# Patient Record
Sex: FEMALE | Race: OTHER | HISPANIC OR LATINO | URBAN - METROPOLITAN AREA
[De-identification: names, ages, dates, MRNs, and addresses within clinical notes are randomized per-mention and may not be internally consistent; named-entity substitution may affect disease eponyms.]

---

## 2021-10-14 NOTE — ED ADULT NURSE NOTE - OBJECTIVE STATEMENT
Pt c/o chronic generalized chest discomfort. Pt irritable and mostly uncooperative w/ staff. Pt denies SOB or dizziness. Pt well appearing.
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
## 2021-10-15 ENCOUNTER — EMERGENCY (EMERGENCY)
Facility: HOSPITAL | Age: 41
LOS: 1 days | Discharge: HOME | End: 2021-10-15
Admitting: EMERGENCY MEDICINE
Payer: SELF-PAY

## 2021-10-15 VITALS
HEIGHT: 67 IN | OXYGEN SATURATION: 99 % | HEART RATE: 83 BPM | RESPIRATION RATE: 18 BRPM | DIASTOLIC BLOOD PRESSURE: 71 MMHG | WEIGHT: 250 LBS | TEMPERATURE: 97 F | SYSTOLIC BLOOD PRESSURE: 117 MMHG

## 2021-10-15 DIAGNOSIS — M54.50 LOW BACK PAIN, UNSPECIFIED: ICD-10-CM

## 2021-10-15 DIAGNOSIS — R07.89 OTHER CHEST PAIN: ICD-10-CM
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
## 2021-10-15 DIAGNOSIS — F17.200 NICOTINE DEPENDENCE, UNSPECIFIED, UNCOMPLICATED: ICD-10-CM

## 2021-10-15 DIAGNOSIS — Z87.898 PERSONAL HISTORY OF OTHER SPECIFIED CONDITIONS: ICD-10-CM
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
## 2021-10-15 PROCEDURE — 93010 ELECTROCARDIOGRAM REPORT: CPT

## 2021-10-15 PROCEDURE — 99284 EMERGENCY DEPT VISIT MOD MDM: CPT

## 2021-10-15 PROCEDURE — 99053 MED SERV 10PM-8AM 24 HR FAC: CPT

## 2021-10-15 RX ORDER — ACETAMINOPHEN 500 MG
650 TABLET ORAL ONCE
Refills: 0 | Status: COMPLETED | OUTPATIENT
Start: 2021-10-15 | End: 2021-10-15

## 2021-10-15 RX ADMIN — Medication 500 MILLIGRAM(S): at 01:33

## 2021-10-15 RX ADMIN — Medication 650 MILLIGRAM(S): at 01:33

## 2021-10-15 NOTE — ED PROVIDER NOTE - PATIENT PORTAL LINK FT
You can access the FollowMyHealth Patient Portal offered by [facility] by registering at the following website: [email or web address]. By joining [employer]’s FollowMyHealth portal, you will also be able to view your health information using other applications (apps) compatible with our system.
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
## 2021-10-15 NOTE — ED PROVIDER NOTE - PHYSICAL EXAMINATION
Gen - Unkempt F, sleeping in chair, comfortable and non-toxic appearing  Skin - warm, dry, intact   HEENT - AT/NC, PERRL, EOMI, pupil 2mm b/l, airway patent, neck supple   CV - S1S2, R/R/R, mild reproducible tenderness in R anterior chest wall, no associated rash/ecchymosis or crepitus noted   Resp - CTAB, no r/r/w  GI - soft, ND, NT, no CVAT b/l   MS - w/w/p, no c/c/e, no midline tenderness or crepitus, no step off, FROM, NV intact, +SILT, compartment soft. pelvis and hips stable  Neuro - AxOx3, ambulatory without gait disturbance
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
## 2021-10-15 NOTE — ED PROVIDER NOTE - CARE PROVIDER_API CALL
Irma Morgan  [address] [place] [address]  Phone: [phone]  Fax: [phone]  Follow Up Time:

## 2021-10-15 NOTE — ED PROVIDER NOTE - NSFOLLOWUPINSTRUCTIONS_ED_ALL_ED_FT
Follow up with your primary care doctor or clinics listed below if you do not have a doctor,    [address] [address]  To make an appointment, call [phone]    [facility]  Address: [facility] [place] [id number]  Appointment Center: [phone] ([phone])     [facility] is a good referral line for crisis and substance abuse help.  [facility] has drop in programs all over the city.    Return to the ER for Emergencies.  Return immediately for any new or worsening symptoms or any new concerns

## 2021-10-15 NOTE — ED PROVIDER NOTE - WET READ LAUNCH FT
1. Liberalize diet to Consistent Carbohydrate (evening snack), Low Sodium. 2. Recommend Glucerna Therapeutic Nutrition Shake 240mls 3x daily (660kcals, 30g protein).
There are no Wet Read(s) to document.
